# Patient Record
Sex: MALE | Race: WHITE | Employment: OTHER | URBAN - METROPOLITAN AREA
[De-identification: names, ages, dates, MRNs, and addresses within clinical notes are randomized per-mention and may not be internally consistent; named-entity substitution may affect disease eponyms.]

---

## 2017-02-14 ENCOUNTER — HOSPITAL ENCOUNTER (EMERGENCY)
Age: 72
Discharge: HOME | End: 2017-02-14
Attending: EMERGENCY MEDICINE
Payer: MEDICARE

## 2017-02-14 VITALS
RESPIRATION RATE: 16 BRPM | DIASTOLIC BLOOD PRESSURE: 86 MMHG | TEMPERATURE: 98.2 F | OXYGEN SATURATION: 96 % | SYSTOLIC BLOOD PRESSURE: 149 MMHG | HEART RATE: 56 BPM | WEIGHT: 194.89 LBS | BODY MASS INDEX: 27.9 KG/M2 | HEIGHT: 70 IN

## 2017-02-14 DIAGNOSIS — S61.219A LACERATION OF FINGER, INITIAL ENCOUNTER: Primary | ICD-10-CM

## 2017-02-14 DIAGNOSIS — Z23 NEED FOR TETANUS BOOSTER: ICD-10-CM

## 2017-02-14 PROCEDURE — 77030002916 HC SUT ETHLN J&J -A

## 2017-02-14 PROCEDURE — 90471 IMMUNIZATION ADMIN: CPT

## 2017-02-14 PROCEDURE — 75810000293 HC SIMP/SUPERF WND  RPR

## 2017-02-14 PROCEDURE — 77030018836 HC SOL IRR NACL ICUM -A

## 2017-02-14 PROCEDURE — 99282 EMERGENCY DEPT VISIT SF MDM: CPT

## 2017-02-14 PROCEDURE — 74011250636 HC RX REV CODE- 250/636: Performed by: PHYSICIAN ASSISTANT

## 2017-02-14 PROCEDURE — 90715 TDAP VACCINE 7 YRS/> IM: CPT | Performed by: PHYSICIAN ASSISTANT

## 2017-02-14 RX ORDER — LIDOCAINE HYDROCHLORIDE 20 MG/ML
5 INJECTION, SOLUTION EPIDURAL; INFILTRATION; INTRACAUDAL; PERINEURAL ONCE
Status: DISCONTINUED | OUTPATIENT
Start: 2017-02-14 | End: 2017-02-14 | Stop reason: HOSPADM

## 2017-02-14 RX ADMIN — TETANUS TOXOID, REDUCED DIPHTHERIA TOXOID AND ACELLULAR PERTUSSIS VACCINE, ADSORBED 0.5 ML: 5; 2.5; 8; 8; 2.5 SUSPENSION INTRAMUSCULAR at 14:29

## 2017-02-14 NOTE — ED PROVIDER NOTES
The history is provided by the patient. No  was used. Bella Beck is a 80 y.o. male who presents ambulatory to [facility] ED c/o laceration to right 3rd finger after snagging finger with nail. Patient was cleaning out his basement and was throwing out old wood though a window when he tossed a piece of wood with nail in it and the nail snagged the finger. Pain is 2/10 scale. Patient with no other c/o or concerns today. Patient is right hand dominant. PCP: Avtar Proctor MD  Allergies: none      There are no other complaints, changes or physical findings at this time. Past Medical History:   Diagnosis Date    Arrhythmia      atrial fibrillation 2014    Arthritis      both hands    Cancer ([address]      skin cancer    Hypertension        History reviewed. No pertinent past surgical history. History reviewed. No pertinent family history. Social History     Social History    Marital status:      Spouse name: N/A    Number of children: N/A    Years of education: N/A     Occupational History    Not on file. Social History Main Topics    Smoking status: Former Smoker     Quit date: 8/16/2014    Smokeless tobacco: Not on file    Alcohol use Not on file      Comment: quit drinking 1 month ago    Drug use: Not on file    Sexual activity: Not on file     Other Topics Concern    Not on file     Social History Narrative         ALLERGIES: Review of patient's allergies indicates no known allergies. Review of Systems   Constitutional: Negative for fatigue and fever. HENT: Negative for rhinorrhea. Respiratory: Negative for shortness of breath. Cardiovascular: Negative for chest pain. Gastrointestinal: Negative for nausea and vomiting. Genitourinary: Negative for dysuria. Musculoskeletal: Negative for back pain and neck pain.    Skin: Positive for wound.        + Laceration to right 3rd finger   Neurological: Negative for dizziness, light-headedness, numbness and headaches. All other systems reviewed and are negative. Vitals:    02/14/17 1248   BP: 149/86   Pulse: (!) 56   Resp: 16   Temp: 98.2 °F (36.8 °C)   SpO2: 96%   Weight: 88.4 kg (194 lb 14.2 oz)   Height: 5' 10\" (1.778 m)            Physical Exam   Constitutional: He is oriented to person, place, and time. He appears well-developed and well-nourished. Non-toxic appearance. No distress. HENT:   Head: Normocephalic and atraumatic. Head is without right periorbital erythema and without left periorbital erythema. Right Ear: External ear normal.   Left Ear: External ear normal.   Eyes: Conjunctivae and EOM are normal. Pupils are equal, round, and reactive to light. No scleral icterus. Neck: Normal range of motion and full passive range of motion without pain. Cardiovascular: Normal rate, regular rhythm and normal heart sounds. Pulmonary/Chest: Effort normal and breath sounds normal. No accessory muscle usage. No tachypnea. No respiratory distress. He has no decreased breath sounds. He has no wheezes. Abdominal: Soft. There is no tenderness. There is no rigidity and no guarding. Musculoskeletal: Normal range of motion. Right hand: He exhibits laceration. He exhibits normal range of motion, no tenderness, normal capillary refill and no deformity. Hands:  Neurological: He is alert and oriented to person, place, and time. He is not disoriented. No cranial nerve deficit or sensory deficit. GCS eye subscore is 4. GCS verbal subscore is 5. GCS motor subscore is 6. Skin: Skin is intact. No rash noted. Psychiatric: He has a normal mood and affect. His speech is normal.   Nursing note and vitals reviewed. MDM  Number of Diagnoses or Management Options  Laceration of finger, initial encounter:   Need for tetanus booster:   Diagnosis management comments: Will update tetanus.   Mechanism does not suggest the likelihood of fracture or foreign body: imaging deferred  Laceration repair as below. Wound care instructions. Return precautions. ED Course       Procedures     Procedure Note - Laceration Repair: right 3rd finger, single layer  2:49 PM  Procedure by KIRSTY Torres  Complexity: complex  3 cm L shaped laceration to right 3rd finger just distal to PIPJ crease was irrigated copiously with NS under jet lavage, prepped with hibi clens and draped in a sterile fashion. The area was anesthetized with x 2 mLs lido 2% without epi via digital block. Tourniquet applied with good hemostasis. Site irrigated copiously with ns. The wound was explored with the following results: no FB seen. The wound was repaired with x 5 simple interrupted sutures with 4.0 ethilon, single layer closure. The wound was closed with good hemostasis and loose approximation. Tourniquet removed with brisk capillary refill. Bulky gauze non stick dressing applied. Estimated blood loss: <1mL  The procedure took 16-30 minutes, and pt tolerated well    . DISCHARGE NOTE:  3:20 PM  The care plan has been outline with the patient and/or family, who verbally conveyed understanding and agreement. Available results have been reviewed. Patient and/or family understand the follow up plan as outlined and discharge instructions. Should their condition deterioration at any time after discharge the patient agrees to return, follow up sooner than outlined or seek medical assistance at the closest Emergency Room as soon as possible. Questions have been answered. Discharge instructions and educational information regarding the patient's diagnosis as well a list of reasons why the patient would want to seek immediate medical attention, should their condition change, were reviewed directly with the patient/family       CLINICAL IMPRESSION:  1. Laceration of finger, initial encounter    2. Need for tetanus booster        Plan:  1.  Wound care reviewed with patient; cleanse with soap/water and dry completely; no prolonged submersion in water.   2. Follow up with PROSPER Ford, x 7-10 days for wound check and suture removal.     Current Discharge Medication List      CONTINUE these medications which have NOT CHANGED    Details   allopurinol (ZYLOPRIM) 300 mg tablet Take 300 mg by mouth daily. valsartan (DIOVAN) 160 mg tablet Take 160 mg by mouth daily. apixaban (ELIQUIS) 5 mg tablet Take 5 mg by mouth two (2) times a day. omega-3 fatty acids-vitamin e (FISH OIL) 1,000 mg cap Take 1 capsule by mouth. dronedarone (MULTAQ) tab tablet Take 1 tablet by mouth two (2) times daily (with meals). Qty: 60 tablet, Refills: 12           Follow-up Information     Follow up With Details Comments Contact Info    Jose Martini MD Schedule an appointment as soon as possible for a visit in 1 week PRIMARY CARE: have stitches removed in 7-10 days [address] [place] [facility] [address] [phone]          Return to the closest emergency room or follow up sooner for any deterioration  Patient's condition is stable and patient is ready to go home. This note is prepared by Mehul Ho, acting as Scribe for SEVEN Briggs PA-C : The scribe's documentation has been prepared under my direction and personally reviewed by me in its entirety. I confirm that the note above accurately reflects all work, treatment, procedures, and medical decision making performed by me.

## 2017-02-14 NOTE — DISCHARGE INSTRUCTIONS
Thank you for allowing us to provide you with care today. We hope we addressed all of your concerns and needs. We strive to provide excellent quality care in the Emergency Department. Please rate us as excellent, as anything less than excellent does not meet our expectations. If you feel that you have not received excellent quality care or timely care, please ask to speak to the nurse manager. Please choose us in the future for your continued health care needs. The exam and treatment you received in the Emergency Department were for an urgent problem and are not intended as complete care. It is important that you follow-up with a doctor, nurse practitioner, or  [id number] assistant to: (1) confirm your diagnosis, (2) re-evaluation of changes in your illness and treatment, and (3) for ongoing care. If your symptoms become worse or you do not improve as expected and you are unable to reach your usual health care provider, you should return to the Emergency Department. We are available 24 hours a day. Take this sheet with you when you go to your follow-up visit. If you have any problem arranging the follow-up visit, contact the Emergency Department immediately. Make an appointment with your Primary Care doctor for follow up of this visit. Return to the ER if you are unable to be seen in the time recommended on your discharge instructions.

## 2017-06-16 ENCOUNTER — HOSPITAL ENCOUNTER (OUTPATIENT)
Dept: GENERAL RADIOLOGY | Age: 72
Discharge: HOME | End: 2017-06-16
Payer: MEDICARE

## 2017-06-16 DIAGNOSIS — Z90.5 S/P NEPHRECTOMY: ICD-10-CM

## 2017-06-16 PROCEDURE — 71020 XR CHEST PA LAT: CPT

## 2017-09-14 ENCOUNTER — HOSPITAL ENCOUNTER (OUTPATIENT)
Dept: ULTRASOUND IMAGING | Age: 72
Discharge: HOME | End: 2017-09-14
Attending: INTERNAL MEDICINE
Payer: MEDICARE

## 2017-09-14 DIAGNOSIS — N18.30 CHRONIC KIDNEY DISEASE, STAGE III (MODERATE) (HCC): ICD-10-CM

## 2017-09-14 PROCEDURE — 76770 US EXAM ABDO BACK WALL COMP: CPT

## 2017-10-24 ENCOUNTER — APPOINTMENT (OUTPATIENT)
Dept: NUCLEAR MEDICINE | Age: 72
DRG: 395 | End: 2017-10-24
Attending: EMERGENCY MEDICINE
Payer: MEDICARE

## 2017-10-24 ENCOUNTER — APPOINTMENT (OUTPATIENT)
Dept: CT IMAGING | Age: 72
DRG: 395 | End: 2017-10-24
Attending: EMERGENCY MEDICINE
Payer: MEDICARE

## 2017-10-24 ENCOUNTER — HOSPITAL ENCOUNTER (INPATIENT)
Age: 72
LOS: 1 days | Discharge: HOME | DRG: 395 | End: 2017-10-26
Attending: EMERGENCY MEDICINE | Admitting: INTERNAL MEDICINE
Payer: MEDICARE

## 2017-10-24 DIAGNOSIS — K92.2 GASTROINTESTINAL HEMORRHAGE, UNSPECIFIED GASTROINTESTINAL HEMORRHAGE TYPE: Primary | ICD-10-CM

## 2017-10-24 LAB
ABO + RH BLD: NORMAL
ALBUMIN SERPL-MCNC: 3.8 G/DL (ref 3.5–5)
ALBUMIN/GLOB SERPL: 1.1 {RATIO} (ref 1.1–2.2)
ALP SERPL-CCNC: 67 U/L (ref 45–117)
ALT SERPL-CCNC: 40 U/L (ref 12–78)
ANION GAP SERPL CALC-SCNC: 6 MMOL/L (ref 5–15)
APTT PPP: 33.6 SEC (ref 22.1–32.5)
AST SERPL-CCNC: 22 U/L (ref 15–37)
BASOPHILS # BLD: 0 K/UL (ref 0–0.1)
BASOPHILS NFR BLD: 0 % (ref 0–1)
BILIRUB SERPL-MCNC: 0.6 MG/DL (ref 0.2–1)
BLOOD GROUP ANTIBODIES SERPL: NORMAL
BUN SERPL-MCNC: 24 MG/DL (ref 6–20)
BUN/CREAT SERPL: 13 (ref 12–20)
CALCIUM SERPL-MCNC: 8.7 MG/DL (ref 8.5–10.1)
CHLORIDE SERPL-SCNC: 105 MMOL/L (ref 97–108)
CO2 SERPL-SCNC: 28 MMOL/L (ref 21–32)
CREAT SERPL-MCNC: 1.78 MG/DL (ref 0.7–1.3)
EOSINOPHIL # BLD: 0.1 K/UL (ref 0–0.4)
EOSINOPHIL NFR BLD: 2 % (ref 0–7)
ERYTHROCYTE [DISTWIDTH] IN BLOOD BY AUTOMATED COUNT: 13.5 % (ref 11.5–14.5)
GLOBULIN SER CALC-MCNC: 3.5 G/DL (ref 2–4)
GLUCOSE SERPL-MCNC: 81 MG/DL (ref 65–100)
HCT VFR BLD AUTO: 37.8 % (ref 36.6–50.3)
HEMOCCULT STL QL: POSITIVE
HGB BLD-MCNC: 13.2 G/DL (ref 12.1–17)
INR PPP: 1.1 (ref 0.9–1.1)
LYMPHOCYTES # BLD: 1.6 K/UL (ref 0.8–3.5)
LYMPHOCYTES NFR BLD: 31 % (ref 12–49)
MCH RBC QN AUTO: 31.7 PG (ref 26–34)
MCHC RBC AUTO-ENTMCNC: 34.9 G/DL (ref 30–36.5)
MCV RBC AUTO: 90.6 FL (ref 80–99)
MONOCYTES # BLD: 0.3 K/UL (ref 0–1)
MONOCYTES NFR BLD: 6 % (ref 5–13)
NEUTS SEG # BLD: 3.2 K/UL (ref 1.8–8)
NEUTS SEG NFR BLD: 61 % (ref 32–75)
PLATELET # BLD AUTO: 134 K/UL (ref 150–400)
POTASSIUM SERPL-SCNC: 4.7 MMOL/L (ref 3.5–5.1)
PROT SERPL-MCNC: 7.3 G/DL (ref 6.4–8.2)
PROTHROMBIN TIME: 10.6 SEC (ref 9–11.1)
RBC # BLD AUTO: 4.17 M/UL (ref 4.1–5.7)
SODIUM SERPL-SCNC: 139 MMOL/L (ref 136–145)
SPECIMEN EXP DATE BLD: NORMAL
THERAPEUTIC RANGE,PTTT: ABNORMAL SECS (ref 58–77)
WBC # BLD AUTO: 5.2 K/UL (ref 4.1–11.1)

## 2017-10-24 PROCEDURE — C9113 INJ PANTOPRAZOLE SODIUM, VIA: HCPCS | Performed by: EMERGENCY MEDICINE

## 2017-10-24 PROCEDURE — 74011250636 HC RX REV CODE- 250/636: Performed by: EMERGENCY MEDICINE

## 2017-10-24 PROCEDURE — 85730 THROMBOPLASTIN TIME PARTIAL: CPT | Performed by: EMERGENCY MEDICINE

## 2017-10-24 PROCEDURE — 80053 COMPREHEN METABOLIC PANEL: CPT | Performed by: EMERGENCY MEDICINE

## 2017-10-24 PROCEDURE — 96374 THER/PROPH/DIAG INJ IV PUSH: CPT

## 2017-10-24 PROCEDURE — 82272 OCCULT BLD FECES 1-3 TESTS: CPT | Performed by: EMERGENCY MEDICINE

## 2017-10-24 PROCEDURE — 65660000000 HC RM CCU STEPDOWN

## 2017-10-24 PROCEDURE — 85610 PROTHROMBIN TIME: CPT | Performed by: EMERGENCY MEDICINE

## 2017-10-24 PROCEDURE — 65390000012 HC CONDITION CODE 44 OBSERVATION

## 2017-10-24 PROCEDURE — 86900 BLOOD TYPING SEROLOGIC ABO: CPT | Performed by: EMERGENCY MEDICINE

## 2017-10-24 PROCEDURE — 74011000250 HC RX REV CODE- 250: Performed by: EMERGENCY MEDICINE

## 2017-10-24 PROCEDURE — 85025 COMPLETE CBC W/AUTO DIFF WBC: CPT | Performed by: EMERGENCY MEDICINE

## 2017-10-24 PROCEDURE — 36415 COLL VENOUS BLD VENIPUNCTURE: CPT | Performed by: EMERGENCY MEDICINE

## 2017-10-24 PROCEDURE — 99284 EMERGENCY DEPT VISIT MOD MDM: CPT

## 2017-10-24 PROCEDURE — A9560 TC99M LABELED RBC: HCPCS

## 2017-10-24 RX ORDER — ROSUVASTATIN CALCIUM 10 MG/1
10 TABLET, COATED ORAL
COMMUNITY

## 2017-10-24 RX ORDER — VALSARTAN 80 MG/1
80 TABLET ORAL DAILY
COMMUNITY

## 2017-10-24 RX ORDER — SODIUM CHLORIDE 9 MG/ML
50 INJECTION, SOLUTION INTRAVENOUS
Status: DISPENSED | OUTPATIENT
Start: 2017-10-24 | End: 2017-10-25

## 2017-10-24 RX ORDER — SODIUM CHLORIDE 0.9 % (FLUSH) 0.9 %
10 SYRINGE (ML) INJECTION
Status: ACTIVE | OUTPATIENT
Start: 2017-10-24 | End: 2017-10-25

## 2017-10-24 RX ORDER — BISMUTH SUBSALICYLATE 262 MG
1 TABLET,CHEWABLE ORAL DAILY
COMMUNITY

## 2017-10-24 RX ADMIN — SODIUM CHLORIDE 40 MG: 9 INJECTION INTRAMUSCULAR; INTRAVENOUS; SUBCUTANEOUS at 19:31

## 2017-10-24 NOTE — IP AVS SNAPSHOT
[address] [facility] 
[phone] Patient: Sabina Richardson MRN: [id number] About your hospitalization You were admitted on:  October 24, 2017 You last received care in the:  [facility] You were discharged on:  October 26, 2017 Why you were hospitalized Your primary diagnosis was:  Not on File Your diagnoses also included:  Acute Gi Bleeding, Gi Bleed Things You Need To Do (next 8 weeks) Follow up with Ad Clements MD in 35 month(s) Repeat Colonoscopy Phone:  [phone] Where:  [address], [address] [facility], [address] [address] Thursday Nov 02, 2017 Follow up with Federico Penaloza MD  
10;00am  hospital follow-up Phone:  [phone] Where:  [address], [address] [address] Discharge Orders None A check wade indicates which time of day the medication should be taken. My Medications TAKE these medications as instructed Instructions Each Dose to Equal  
 Morning Noon Evening Bedtime COENZYME Q10 PO Your last dose was: Your next dose is: Take 1 Tab by mouth daily. 1 Tab  
    
   
   
   
  
 dronedarone Tab tablet Commonly known as:  Orma Chard Your last dose was: Your next dose is: Take 1 tablet by mouth two (2) times daily (with meals). 400 mg  
    
   
   
   
  
 ELIQUIS 5 mg tablet Generic drug:  apixaban  
Start taking on:  11/8/2017 Your last dose was: Your next dose is: Take 1 Tab by mouth two (2) times a day. 5 mg FISH OIL 1,000 mg Cap Generic drug:  omega-3 fatty acids-vitamin e Your last dose was: Your next dose is: Take 1 capsule by mouth. 1 Cap  
    
   
   
   
  
 multivitamin tablet Commonly known as:  ONE A DAY  
   
 Your last dose was: Your next dose is: Take 1 Tab by mouth daily. 1 Tab  
    
   
   
   
  
 rosuvastatin 10 mg tablet Commonly known as:  CRESTOR Your last dose was: Your next dose is: Take 10 mg by mouth daily (after lunch). 10 mg  
    
   
   
   
  
 valsartan 80 mg tablet Commonly known as:  DIOVAN Your last dose was: Your next dose is: Take 80 mg by mouth daily. 80 mg Discharge Instructions HOSPITALIST DISCHARGE INSTRUCTIONS 
 
NAME: Claudeen Hickman :  1945 MRN:  [id number] Date/Time:  10/26/2017 9:46 AM 
 
ADMIT DATE: 10/24/2017 DISCHARGE DATE: 10/26/2017 · It is important that you take the medication exactly as they are prescribed. · Keep your medication in the bottles provided by the pharmacist and keep a list of the medication names, dosages, and times to be taken in your wallet. · Do not take other medications without consulting your doctor. What to do at [facility] Recommended diet:  Regular Diet Recommended activity: Activity as tolerated If you have questions regarding the hospital related prescriptions or hospital related issues please call [facility] at [phone]. You can always direct your questions to your primary care doctor if you are unable to reach your hospital physician; your PCP works as an extension of your hospital doctor just like your hospital doctor is an extension of your PCP for your time at the hospital [facility], [facility]) If you experience any of the following symptoms then please call your primary care physician or return to the emergency room if you cannot get hold of your doctor: 
 
Fever, chills, nausea, vomiting, or persistent diarrhea Worsening weakness or new problems with your speech or balance Dark stools or visible blood in your stools New Leg swelling or shortness of breath as this could be signs of a clot Additional Instructions: 
 
 
Bring these papers with you to your follow up appointments. The papers will help your doctors be sure to continue the care plan from the hospital. 
 
 
 
 
 
 
Information obtained by : 
I understand that if any problems occur once I am at home I am to contact my physician. I understand and acknowledge receipt of the instructions indicated above. Physician's or R.N.'s Signature                                                                  Date/Time Patient or Representative Signature Introducing [address] [facility] introduces [employer] patient portal. Now you can access parts of your medical record, email your doctor's office, and request medication refills online. 1. In your internet browser, go to [email or web address] 2. Click on the First Time User? Click Here link in the Sign In box. You will see the New Member Sign Up page. 3. Enter your [employer] Access Code exactly as it appears below. You will not need to use this code after youve completed the sign-up process. If you do not sign up before the expiration date, you must request a new code. · [employer] Access Code: [email or web address] Expires: 12/7/2017  1:37 PM 
 
4. Enter the last four digits of your Social Security Number (xxxx) and Date of Birth (mm/dd/yyyy) as indicated and click Submit. You will be taken to the next sign-up page. 5. Create a [employer] ID. This will be your [employer] login ID and cannot be changed, so think of one that is secure and easy to remember. 6. Create a [employer] password. You can change your password at any time. 7. Enter your Password Reset Question and Answer. This can be used at a later time if you forget your password. 8. Enter your e-mail address. You will receive e-mail notification when new information is available in [address] 9. Click Sign Up. You can now view and download portions of your medical record. 10. Click the Download Summary menu link to download a portable copy of your medical information. If you have questions, please visit the Frequently Asked Questions section of the [employer] website. Remember, [employer] is NOT to be used for urgent needs. For medical emergencies, dial 911. Now available from your iPhone and Android! Unresulted Labs-Please follow up with your PCP about these lab tests Order Current Status SAMPLE TO BLOOD BANK In process SAMPLE TO BLOOD BANK In process Providers Seen During Your Hospitalization Provider Specialty Primary office phone Vee Nicholas MD Emergency Medicine [phone] Apollo Pardo MD Internal Medicine [phone] Kaleb Zheng MD Internal Medicine [phone] Your Primary Care Physician (PCP) Primary Care Physician Office Phone Office Fax Guy Castro, [address] [phone] You are allergic to the following No active allergies Recent Documentation Height Weight BMI Smoking Status 1.778 m 78.2 kg 24.74 kg/m2 Former Smoker Emergency Contacts Name Discharge Info Relation Home Work Mobile Sharon Mary DISCHARGE CAREGIVER [3] Spouse [3] [phone] Patient Belongings The following personal items are in your possession at time of discharge: 
  Dental Appliances: None  Visual Aid: Glasses, At home      Home Medications: None   Jewelry: Ring  Clothing: None    Other Valuables: None Please provide this summary of care documentation to your next provider. Signatures-by signing, you are acknowledging that this After Visit Summary has been reviewed with you and you have received a copy. Patient Signature:  ____________________________________________________________ Date:  ____________________________________________________________  
  
Neelam  Provider Signature:  ____________________________________________________________ Date:  ____________________________________________________________

## 2017-10-24 NOTE — ED PROVIDER NOTES
[facility] [address]  EMERGENCY DEPARTMENT HISTORY AND PHYSICAL EXAM       Date of Service: 10/24/2017   Patient Name: Breck Nissen   YOB: 1945  Medical Record Number: [id number]    History of Presenting Illness     Chief Complaint   Patient presents with    Rectal Bleeding     Onset at 1300 today. Pt denies rectal trauma. History Provided By:  patient    Additional History:   Breck Nissen is a 71 y.o. male with PMHx significant for HTN and afib who presents ambulatory to the ED with cc of two episodes of BRB per rectum that started this morning. He states that his first episode of BRB per rectum occurred after a bowel movement when he was wiping. He states that during his second episode, he saw blood soaked through his underwear onto the chair he was seated on. He notes that he has a history of similar symptoms, but denies medical evaluation at that time. He reports that he is prescribed Eliquis. He denies recent colonoscopy. He also denies close GI follow up or history of diverticulosis. He specifically denies nausea, vomiting, abdominal pain, diarrhea, or other complaints at this time. Social Hx: - Tobacco (former), - EtOH    There are no other complaints, changes or physical findings at this time. Primary Care Provider: Ryan Yu MD     Past History     Past Medical History:   Past Medical History:   Diagnosis Date    Arrhythmia     atrial fibrillation 2014    Arthritis     both hands    Cancer ([facility] [address]     skin cancer    Hypertension         Past Surgical History:   History reviewed. No pertinent surgical history. Family History:   History reviewed. No pertinent family history.      Social History:   Social History   Substance Use Topics    Smoking status: Former Smoker     Quit date: 8/16/2014    Smokeless tobacco: Never Used    Alcohol use None      Comment: quit drinking 1 month ago        Allergies:   No Known Allergies      Review of Systems   Review of Systems   Constitutional: Negative for chills and fever. Respiratory: Negative for cough and shortness of breath. Cardiovascular: Negative for chest pain. Gastrointestinal: Positive for blood in stool. Negative for abdominal pain, constipation, diarrhea, nausea and vomiting. Neurological: Negative for weakness and numbness. All other systems reviewed and are negative. Physical Exam  Physical Exam   Constitutional: He is oriented to person, place, and time. He appears well-developed and well-nourished. HENT:   Head: Normocephalic and atraumatic. Eyes: Conjunctivae and EOM are normal.   Neck: Normal range of motion. Neck supple. Cardiovascular: Normal rate and regular rhythm. Pulmonary/Chest: Effort normal and breath sounds normal. No respiratory distress. Abdominal: Soft. He exhibits no distension. There is no tenderness. Genitourinary:   Genitourinary Comments: Rectal exam: Gross blood collected. No hemorrhoids, fissures, or tears. Musculoskeletal: Normal range of motion. Neurological: He is alert and oriented to person, place, and time. Skin: Skin is warm and dry. Psychiatric: He has a normal mood and affect. Nursing note and vitals reviewed. Medical Decision Making   I am the first provider for this patient. I reviewed the vital signs, available nursing notes, past medical history, past surgical history, family history and social history. Old Medical Records: Old medical records. Nursing notes. Provider Notes:   Patient presents with lower GI bleeding. DDx: AVM, diverticulosis, diverticulitis, IBD, IBS, colitis, hemorrhoids. Will get labs, T+S, keep on monitor and give fluids to start if prior EF% allows. Orthostatics obtained. ED Course:  5:58 PM   Initial assessment performed. The patients presenting problems have been discussed, and they are in agreement with the care plan formulated and outlined with them.   I have encouraged them to ask questions as they arise throughout their visit. Procedure Note - Rectal Exam:   6:05 PM  Performed by: Melina Elaine MD  Chaperoned by: Mendoza Burden RN  Rectal exam performed. Gross blood was collected. Stool was collected and sent to the lab for Hemoccult testing. Other findings: No hemorrhoids, fissures, or tears. The procedure took 1-15 minutes, and pt tolerated well. Progress Note:  6:20 PM  The patient and his family were informed of the likelihood that he will be admitted today with GI consultation for a colonoscopy. Pt is in agreement with the plan. Written by Tameka Reese ED Scribe, as dictated by Melina Elaine MD.    Consult Note:  7:06 PM  Melina Elaine MD spoke with Fide Hernandez M.D.,  Specialty: GI  Discussed pt's hx, disposition, and available diagnostic and imaging results. Reviewed care plans. Consultant agrees with plans as outlined. Dr. Brayton Landau recommends hospitalist admission. She also recommends ordering Protonix and a bleeding scan. CONSULT NOTE:   8:33 PM  Melina Elaine MD spoke with Zuleyma Reyes MD,   Specialty: Hospitalist  Discussed pt's hx, disposition, and available diagnostic and imaging results. Reviewed care plans. Consultant will evaluate pt for admission.   Written by Tameka Reese ED Scribe, as dictated by Melina Elaine MD.      Diagnostic Study Results     Labs -      Recent Results (from the past 12 hour(s))   TYPE & SCREEN    Collection Time: 10/24/17  5:20 PM   Result Value Ref Range    Crossmatch Expiration 10/27/2017     ABO/Rh(D) Maxcine Guanaco POSITIVE     Antibody screen NEG    CBC WITH AUTOMATED DIFF    Collection Time: 10/24/17  5:21 PM   Result Value Ref Range    WBC 5.2 4.1 - 11.1 K/uL    RBC 4.17 4.10 - 5.70 M/uL    HGB 13.2 12.1 - 17.0 g/dL    HCT 37.8 36.6 - 50.3 %    MCV 90.6 80.0 - 99.0 FL    MCH 31.7 26.0 - 34.0 PG    MCHC 34.9 30.0 - 36.5 g/dL    RDW 13.5 11.5 - 14.5 %    PLATELET 229 (L) 616 - 400 K/uL    NEUTROPHILS 61 32 - 75 % LYMPHOCYTES 31 12 - 49 %    MONOCYTES 6 5 - 13 %    EOSINOPHILS 2 0 - 7 %    BASOPHILS 0 0 - 1 %    ABS. NEUTROPHILS 3.2 1.8 - 8.0 K/UL    ABS. LYMPHOCYTES 1.6 0.8 - 3.5 K/UL    ABS. MONOCYTES 0.3 0.0 - 1.0 K/UL    ABS. EOSINOPHILS 0.1 0.0 - 0.4 K/UL    ABS. BASOPHILS 0.0 0.0 - 0.1 K/UL   METABOLIC PANEL, COMPREHENSIVE    Collection Time: 10/24/17  5:21 PM   Result Value Ref Range    Sodium 139 136 - 145 mmol/L    Potassium 4.7 3.5 - 5.1 mmol/L    Chloride 105 97 - 108 mmol/L    CO2 28 21 - 32 mmol/L    Anion gap 6 5 - 15 mmol/L    Glucose 81 65 - 100 mg/dL    BUN 24 (H) 6 - 20 MG/DL    Creatinine 1.78 (H) 0.70 - 1.30 MG/DL    BUN/Creatinine ratio 13 12 - 20      GFR est AA 46 (L) >60 ml/min/1.73m2    GFR est non-AA 38 (L) >60 ml/min/1.73m2    Calcium 8.7 8.5 - 10.1 MG/DL    Bilirubin, total 0.6 0.2 - 1.0 MG/DL    ALT (SGPT) 40 12 - 78 U/L    AST (SGOT) 22 15 - 37 U/L    Alk. phosphatase 67 45 - 117 U/L    Protein, total 7.3 6.4 - 8.2 g/dL    Albumin 3.8 3.5 - 5.0 g/dL    Globulin 3.5 2.0 - 4.0 g/dL    A-G Ratio 1.1 1.1 - 2.2     PROTHROMBIN TIME + INR    Collection Time: 10/24/17  5:21 PM   Result Value Ref Range    INR 1.1 0.9 - 1.1      Prothrombin time 10.6 9.0 - 11.1 sec   PTT    Collection Time: 10/24/17  5:21 PM   Result Value Ref Range    aPTT 33.6 (H) 22.1 - 32.5 sec    aPTT, therapeutic range     58.0 - 77.0 SECS   OCCULT BLOOD, STOOL    Collection Time: 10/24/17  6:27 PM   Result Value Ref Range    Occult blood, stool POSITIVE (A) NEG         Vital Signs-Reviewed the patient's vital signs.    Patient Vitals for the past 12 hrs:   Temp Pulse Resp BP SpO2   10/24/17 2000 - (!) 44 8 150/62 100 %   10/24/17 1900 - (!) 42 12 153/67 100 %   10/24/17 1836 - (!) 46 - 172/67 -   10/24/17 1625 98.5 °F (36.9 °C) (!) 50 16 170/80 99 %       Medications Given in the ED:  Medications   0.9% sodium chloride infusion (not administered)   iopamidol (ISOVUE-370) 76 % injection 100 mL (not administered)   sodium chloride (NS) flush 10 mL (not administered)   sodium chloride 0.9 % bolus infusion 1,000 mL (not administered)   pantoprazole (PROTONIX) 40 mg in sodium chloride 0.9 % 10 mL injection (40 mg IntraVENous Given 10/24/17 1931)       Diagnosis   Clinical Impression:   1. Gastrointestinal hemorrhage, unspecified gastrointestinal hemorrhage type         Plan:  1: Admit to Hospitalist    Disposition Note:  Admit Note:  8:33 PM  Pt is being admitted by Esmer Zambrano MD. The results of their tests and reason(s) for their admission have been discussed with pt and/or available family. They convey agreement and understanding for the need to be admitted and for admission diagnosis. This note is prepared by Erik Puente, acting as a Scribe for Berta Silvestre MD.    Berta Silvestre MD: The scribe's documentation has been prepared under my direction and personally reviewed by me in its entirety. I confirm that the notes above accurately reflects all work, treatment, procedures, and medical decision making performed by me. This note will not be viewable in [address]

## 2017-10-24 NOTE — ED NOTES
Bedside shift change report given to Lexie Lambert RN (oncoming nurse) by Angelo Pham RN (offgoing nurse). Report included the following information SBAR, ED Summary and MAR.

## 2017-10-25 ENCOUNTER — ANESTHESIA EVENT (OUTPATIENT)
Dept: ENDOSCOPY | Age: 72
DRG: 395 | End: 2017-10-25
Payer: MEDICARE

## 2017-10-25 LAB
ANION GAP SERPL CALC-SCNC: 9 MMOL/L (ref 5–15)
BUN SERPL-MCNC: 20 MG/DL (ref 6–20)
BUN/CREAT SERPL: 13 (ref 12–20)
CALCIUM SERPL-MCNC: 8.5 MG/DL (ref 8.5–10.1)
CHLORIDE SERPL-SCNC: 108 MMOL/L (ref 97–108)
CO2 SERPL-SCNC: 24 MMOL/L (ref 21–32)
CREAT SERPL-MCNC: 1.53 MG/DL (ref 0.7–1.3)
GLUCOSE SERPL-MCNC: 72 MG/DL (ref 65–100)
HCT VFR BLD AUTO: 38.4 % (ref 36.6–50.3)
HCT VFR BLD AUTO: 39 % (ref 36.6–50.3)
HGB BLD-MCNC: 12.7 G/DL (ref 12.1–17)
HGB BLD-MCNC: 13.3 G/DL (ref 12.1–17)
MAGNESIUM SERPL-MCNC: 2.1 MG/DL (ref 1.6–2.4)
PHOSPHATE SERPL-MCNC: 3.1 MG/DL (ref 2.6–4.7)
POTASSIUM SERPL-SCNC: 4 MMOL/L (ref 3.5–5.1)
SODIUM SERPL-SCNC: 141 MMOL/L (ref 136–145)

## 2017-10-25 PROCEDURE — 96376 TX/PRO/DX INJ SAME DRUG ADON: CPT

## 2017-10-25 PROCEDURE — 36415 COLL VENOUS BLD VENIPUNCTURE: CPT | Performed by: INTERNAL MEDICINE

## 2017-10-25 PROCEDURE — 65390000012 HC CONDITION CODE 44 OBSERVATION

## 2017-10-25 PROCEDURE — 74011250636 HC RX REV CODE- 250/636: Performed by: GENERAL ACUTE CARE HOSPITAL

## 2017-10-25 PROCEDURE — 74011000250 HC RX REV CODE- 250: Performed by: INTERNAL MEDICINE

## 2017-10-25 PROCEDURE — C9113 INJ PANTOPRAZOLE SODIUM, VIA: HCPCS | Performed by: GENERAL ACUTE CARE HOSPITAL

## 2017-10-25 PROCEDURE — 85018 HEMOGLOBIN: CPT | Performed by: GENERAL ACUTE CARE HOSPITAL

## 2017-10-25 PROCEDURE — 84100 ASSAY OF PHOSPHORUS: CPT | Performed by: GENERAL ACUTE CARE HOSPITAL

## 2017-10-25 PROCEDURE — 83735 ASSAY OF MAGNESIUM: CPT | Performed by: GENERAL ACUTE CARE HOSPITAL

## 2017-10-25 PROCEDURE — 99218 HC RM OBSERVATION: CPT

## 2017-10-25 PROCEDURE — 74011000250 HC RX REV CODE- 250: Performed by: GENERAL ACUTE CARE HOSPITAL

## 2017-10-25 PROCEDURE — 80048 BASIC METABOLIC PNL TOTAL CA: CPT | Performed by: GENERAL ACUTE CARE HOSPITAL

## 2017-10-25 PROCEDURE — 74011250637 HC RX REV CODE- 250/637: Performed by: GENERAL ACUTE CARE HOSPITAL

## 2017-10-25 RX ORDER — SODIUM CHLORIDE 0.9 % (FLUSH) 0.9 %
5-10 SYRINGE (ML) INJECTION AS NEEDED
Status: DISCONTINUED | OUTPATIENT
Start: 2017-10-25 | End: 2017-10-26 | Stop reason: HOSPADM

## 2017-10-25 RX ORDER — SODIUM CHLORIDE 0.9 % (FLUSH) 0.9 %
5-10 SYRINGE (ML) INJECTION EVERY 8 HOURS
Status: DISCONTINUED | OUTPATIENT
Start: 2017-10-25 | End: 2017-10-26 | Stop reason: HOSPADM

## 2017-10-25 RX ORDER — THERA TABS 400 MCG
1 TAB ORAL DAILY
Status: DISCONTINUED | OUTPATIENT
Start: 2017-10-25 | End: 2017-10-26 | Stop reason: HOSPADM

## 2017-10-25 RX ORDER — POLYETHYLENE GLYCOL 3350, SODIUM SULFATE ANHYDROUS, SODIUM BICARBONATE, SODIUM CHLORIDE, POTASSIUM CHLORIDE 236; 22.74; 6.74; 5.86; 2.97 G/4L; G/4L; G/4L; G/4L; G/4L
4000 POWDER, FOR SOLUTION ORAL ONCE
Status: COMPLETED | OUTPATIENT
Start: 2017-10-25 | End: 2017-10-25

## 2017-10-25 RX ORDER — ATORVASTATIN CALCIUM 20 MG/1
20 TABLET, FILM COATED ORAL DAILY
Status: DISCONTINUED | OUTPATIENT
Start: 2017-10-25 | End: 2017-10-26 | Stop reason: HOSPADM

## 2017-10-25 RX ADMIN — THERA TABS 1 TABLET: TAB at 10:22

## 2017-10-25 RX ADMIN — Medication 10 ML: at 21:37

## 2017-10-25 RX ADMIN — Medication 5 ML: at 02:03

## 2017-10-25 RX ADMIN — Medication 10 ML: at 10:23

## 2017-10-25 RX ADMIN — OMEGA-3 FATTY ACIDS CAP DELAYED RELEASE 1000 MG 1 CAPSULE: 1000 CAPSULE DELAYED RELEASE at 10:22

## 2017-10-25 RX ADMIN — SODIUM CHLORIDE 40 MG: 9 INJECTION INTRAMUSCULAR; INTRAVENOUS; SUBCUTANEOUS at 10:21

## 2017-10-25 RX ADMIN — SODIUM CHLORIDE 40 MG: 9 INJECTION INTRAMUSCULAR; INTRAVENOUS; SUBCUTANEOUS at 20:15

## 2017-10-25 RX ADMIN — Medication 10 ML: at 05:26

## 2017-10-25 RX ADMIN — DRONEDARONE 400 MG: 400 TABLET, FILM COATED ORAL at 17:58

## 2017-10-25 RX ADMIN — DRONEDARONE 400 MG: 400 TABLET, FILM COATED ORAL at 10:22

## 2017-10-25 RX ADMIN — ATORVASTATIN CALCIUM 20 MG: 20 TABLET, FILM COATED ORAL at 10:22

## 2017-10-25 RX ADMIN — Medication 10 ML: at 15:47

## 2017-10-25 RX ADMIN — POLYETHYLENE GLYCOL 3350, SODIUM SULFATE ANHYDROUS, SODIUM BICARBONATE, SODIUM CHLORIDE, POTASSIUM CHLORIDE 4000 ML: 236; 22.74; 6.74; 5.86; 2.97 POWDER, FOR SOLUTION ORAL at 15:42

## 2017-10-25 NOTE — CDMP QUERY
Dr. Karen Posada,    Please clarify if this patient is being treated/managed for:    =>CKD stage III in the setting of GFR of 38/45 requiring monitoring  =>Other Explanation of clinical findings  =>Unable to Determine (no explanation of clinical findings)    The medical record reflects the following clinical findings, treatment, and risk factors:    Risk Factors: 70 y/o pt  Clinical Indicators: GFR 38/45  Treatment: monitoring    Please clarify and document your clinical opinion in the progress notes and discharge summary including the definitive and/or presumptive diagnosis, (suspected or probable), related to the above clinical findings. Please include clinical findings supporting your diagnosis.     Thank you,   Claudia Sandoval, [address]

## 2017-10-25 NOTE — PROGRESS NOTES
As per  notification code 44 given. Oral and Written notification given to patient and/or caregiver informing them that they are currently an Outpatient receiving care in our facility. Outpatient services include Observation Services.      Raman Toure  Ext [phone]

## 2017-10-25 NOTE — ANESTHESIA PREPROCEDURE EVALUATION
Anesthetic History   No history of anesthetic complications            Review of Systems / Medical History  Patient summary reviewed, nursing notes reviewed and pertinent labs reviewed    Pulmonary  Within defined limits                 Neuro/Psych   Within defined limits           Cardiovascular    Hypertension        Dysrhythmias (2014 cardioversion x 1 for AFib;    Hx of SBrady, asymptomatic)   Hyperlipidemia    Exercise tolerance: >4 METS  Comments:  EKG: SB, rate 45,  with PACs   GI/Hepatic/Renal               Comments: Hematochezia Endo/Other        Arthritis and cancer ([facility])     Other Findings            Physical Exam    Airway  Mallampati: II  TM Distance: 4 - 6 cm  Neck ROM: normal range of motion   Mouth opening: Normal     Cardiovascular  Regular rate and rhythm,  S1 and S2 normal,  no murmur, click, rub, or gallop             Dental      Comments: Discolored, dark teeth, none loose   Pulmonary  Breath sounds clear to auscultation               Abdominal  GI exam deferred       Other Findings            Anesthetic Plan    ASA: 2  Anesthesia type: total IV anesthesia          Induction: Intravenous  Anesthetic plan and risks discussed with: Patient

## 2017-10-25 NOTE — CDMP QUERY
Dr. Bowen Betancur,    Thank you for documenting the diagnosis of A-fib for this patient. Please clarify if this diagnosis could be further specified as:     =>Paroxysmal  =>Persistent  =>Permanent  =>Other explanations of clinical findings  =>Unable to Determine    Please document your response indicating your clinical opinion in your progress notes and discharge summary. -------------------------------------------------------------------------------------------------  Paroxysmal:  begins suddenly and stops on its own. Symptoms can be mild or severe. They stop within about a week, but usually in less than 24 hours. Persistent: continues for more than a week. It may stop on its own, or it can be stopped with treatment. Permanent: cannot be restored with treatment    Please clarify and document your clinical opinion in the progress notes and discharge summary including the definitive and/or presumptive diagnosis, (suspected or probable), related to the above clinical findings. Please include clinical findings supporting your diagnosis.     Thank you,   Layla Goldstein, [address] [facility]

## 2017-10-25 NOTE — PROGRESS NOTES
0045 - TRANSFER - IN REPORT:    Verbal report received from [facility]) on Mavis Seo  being received from ED(unit) for routine progression of care    Report consisted of patients Situation, Background, Assessment and   Recommendations(SBAR). Information from the following report(s) SBAR, Kardex, ED Summary, Procedure Summary, Intake/Output, MAR, Accordion, Recent Results and Med Rec Status was reviewed with the receiving nurse. Opportunity for questions and clarification was provided. Assessment completed upon patients arrival to unit and care assumed. 9174- Patient arrived to room 3235 from [address] via stretcher, patient placed into bed in position of comfort. Oriented patient to room/unit. CB in reach.

## 2017-10-25 NOTE — PROGRESS NOTES
1556 H/H sent to the lab:     Results for Junito Lozada (MRN [id number]) as of 10/25/2017 17:09     Ref. Range 10/25/2017 15:56   HGB Latest Ref Range: 12.1 - 17.0 g/dL 13.3   HCT Latest Ref Range: 36.6 - 50.3 % 39.0         1800 Consent signed.

## 2017-10-25 NOTE — CONSULTS
GASTROENTEROLOGY CONSULTATION NOTE                            P. Cherie Maravilla MD  [facility], [address], [facility]  [address]  [phone]  [email or web address]        NAME:  Mavis Seo   :   1945   MRN:   [id number]   PCP: Poncho Latham MD  Date/Time:  10/25/2017 7:46 AM    Referring Physician: Kassy Cline MD    Consult Date: 10/25/2017 7:46 AM    Reason for consult: rectal bleeding                   Assessment:   1. Hematochezia, acute onset. Two episodes. 2. No significant drop in hgb  3. Anticoagulated with Eliquis  4. Remote colonoscopy       Active Problems:    Acute GI bleeding (10/24/2017)        Plan:   Clear liquids  Bowel prep today  Colonoscopy in am         History of Present Illness:  Patient is a 71 y.o. who is seen in consultation at the request of Dr. José Manuel Vines for rectal bleeding    Mavis Seo is a 71 y.o.   male who came to [facility] ED complaing of acute onset or red rectal bleeding. Has had occasional red blood on toilet paper when wipes for several years. Yesterday morning he noted more significant amount of red blood with stool. Then later in the morning he realized he had passed a larger amount of red blood that soaked through his pants onto chair. Was not associated with a bowel movement. No further episodes after that. Has been on Eliquis for history of afib. No asa or nsaid use. No other GI symptoms. No hx of PUD. Had a colonoscopy more than 20 yrs ago. Not sure if had any diverticulosis.         PMH:  Past Medical History:   Diagnosis Date    Arrhythmia     atrial fibrillation 2014    Arthritis     both hands    Cancer ([address]     skin cancer    Hypertension        PSH:  Past Surgical History:   Procedure Laterality Date    HX NEPHRECTOMY Right     d/t renal CA       Allergies:  No Known Allergies      Hospital Medications:  Current Facility-Administered Medications   Medication Dose Route Frequency    dronedarone (MULTAQ) tablet tab 400 mg  400 mg Oral BID WITH MEALS    therapeutic multivitamin (THERAGRAN) tablet 1 Tab  1 Tab Oral DAILY    fish oil-omega-3 fatty acids 340-1,000 mg capsule 1 Cap  1 Cap Oral DAILY    atorvastatin (LIPITOR) tablet 20 mg  20 mg Oral DAILY    sodium chloride (NS) flush 5-10 mL  5-10 mL IntraVENous Q8H    sodium chloride (NS) flush 5-10 mL  5-10 mL IntraVENous PRN    sodium chloride 0.9 % bolus infusion 1,000 mL  1,000 mL IntraVENous ONCE    pantoprazole (PROTONIX) 40 mg in sodium chloride 0.9 % 10 mL injection  40 mg IntraVENous Q12H       Home Medications:  Prior to Admission Medications   Prescriptions Last Dose Informant Patient Reported? Taking? UBIDECARENONE (COENZYME Q10 PO) 10/24/2017 at Unknown time Self Yes Yes   Sig: Take 1 Tab by mouth daily. apixaban (ELIQUIS) 5 mg tablet 10/24/2017 at Unknown time Self Yes Yes   Sig: Take 5 mg by mouth two (2) times a day. dronedarone (MULTAQ) tab tablet 10/24/2017 at Unknown time Self No Yes   Sig: Take 1 tablet by mouth two (2) times daily (with meals). multivitamin (ONE A DAY) tablet 10/24/2017 at Unknown time Self Yes Yes   Sig: Take 1 Tab by mouth daily. omega-3 fatty acids-vitamin e (FISH OIL) 1,000 mg cap 10/24/2017 at Unknown time Self Yes Yes   Sig: Take 1 capsule by mouth. rosuvastatin (CRESTOR) 10 mg tablet 10/24/2017 at Unknown time Self Yes Yes   Sig: Take 10 mg by mouth daily (after lunch). valsartan (DIOVAN) 80 mg tablet 10/24/2017 at Unknown time Self Yes Yes   Sig: Take 80 mg by mouth daily. Facility-Administered Medications: None       Social History:  Social History   Substance Use Topics    Smoking status: Former Smoker     Quit date: 8/16/2014    Smokeless tobacco: Never Used    Alcohol use Not on file      Comment: quit drinking 1 month ago       Family History:  History reviewed. No pertinent family history.           Objective:   Patient Vitals for the past 8 hrs:   BP Temp Pulse Resp SpO2   10/25/17 0600 156/75 98.3 °F (36.8 °C) (!) 43 18 97 %   10/25/17 0153 164/42 97.8 °F (36.6 °C) (!) 42 18 97 %             EXAM:     NEURO-a&o   HEENT-wnl   LUNGS-clear    COR-regular rate and rhythym     ABD-soft , no tenderness, no rebound, good bs     EXT-no edema     Data Review     Recent Labs      10/25/17   0430  10/24/17   1721   WBC   --   5.2   HGB  12.7  13.2   HCT  38.4  37.8   PLT   --   134*     Recent Labs      10/25/17   0430  10/24/17   1721   NA  141  139   K  4.0  4.7   CL  108  105   CO2  24  28   BUN  20  24*   CREA  1.53*  1.78*   GLU  72  81   PHOS  3.1   --    CA  8.5  8.7     Recent Labs      10/24/17   1721   SGOT  22   AP  67   TP  7.3   ALB  3.8   GLOB  3.5     Recent Labs      10/24/17   1721   INR  1.1   PTP  10.6   APTT  33.6*        IMAGING RESULTS:   []      I have personally reviewed the actual   []    CXR  []    CT  []     [address] discussed with:    [x]    Patient   []    Family   []    Nursing   []    Attending    Amber Sewell MD

## 2017-10-25 NOTE — ED NOTES
TRANSFER - OUT REPORT:    Verbal report given to JOI Batres(name) on Bailey Brunner  being transferred to Ortho(unit) for routine progression of care       Report consisted of patients Situation, Background, Assessment and   Recommendations(SBAR). Information from the following report(s) SBAR, ED Summary and MAR was reviewed with the receiving nurse. Lines:   Peripheral IV 10/24/17 Left Antecubital (Active)   Site Assessment Clean, dry, & intact 10/24/2017  6:36 PM   Phlebitis Assessment 0 10/24/2017  6:36 PM   Infiltration Assessment 0 10/24/2017  6:36 PM   Dressing Status Clean, dry, & intact 10/24/2017  6:36 PM   Hub Color/Line Status Pink 10/24/2017  6:36 PM        Opportunity for questions and clarification was provided.       Patient transported with:   Nuclear Med tech

## 2017-10-25 NOTE — ED NOTES
Notified KnowRe of pt's room assignment- [phone]. KnowRe stated she will take him there once study is completed.

## 2017-10-25 NOTE — H&P
Hospitalist Admission Note    NAME: Mavis Seo   :  1945   MRN:  [id number]     Date/Time:  10/24/2017 11:32 PM    Patient PCP: Poncho Latham MD  ________________________________________________________________________    My assessment of this patient's clinical condition and my plan of care is as follows. Assessment / Plan:  GI Bleed, hemodynamically stable  AFib, on Eliquis - currently held  HTN  -GI called by ED, recommendation is for NM scan and Protonix  -GI consult placed for tomorrow as well; NPO  -Current H/H 13.2/37.8, INR 1.1, Occult blood +  -H/H q8hrs; transfuse if Hgb less than 7  -Continue Afib home meds - holding Eliquis  -Will hold HTN med till repeat H/H shows stability    CKD, secondary to left nephrectomy in Dec 2015  -Pt has Nephrologist that he follows up with  -BUn/Cr .78  -Continue to monitor  -Avoid nephrotoxic drugs  -IVF    Code Status: Full  Surrogate Decision MakerRdalila Brower, wife, [phone]    DVT Prophylaxis: SCD  GI Prophylaxis: Protonix    Baseline: Independent ADLs        Subjective:   CHIEF COMPLAINT: blood soaked underwear    HISTORY OF PRESENT ILLNESS:     Mavis Seo is a 71 y.o.  male who presents with CC listed above. He states that for the past 3-4 years, he has noticed occasional bleeding from his rectum. He states that it is never in his actual stool, but rather he notices it on the toilet paper when he wipes. He states that this time, he noticed some blood on his TP this morning, and early this afternoon while he was sitting on a chair, he noticed that his underwear and pants were being stained with blood. He states that he was diagnosed with Afib 2-3 years ago and has since been on Eliquis but has noticed the blood on TP since before then. States that his last colonoscopy was 20+ years ago. Denies any NSAID use. Denies any CP, abdominal pain, dizziness, or double vision.     We were asked to admit for work up and evaluation of the above problems. Past Medical History:   Diagnosis Date    Arrhythmia     atrial fibrillation 2014    Arthritis     both hands    Cancer ([facility] [address]     skin cancer    Hypertension         History reviewed. No pertinent surgical history. Social History   Substance Use Topics    Smoking status: Former Smoker     Quit date: 8/16/2014    Smokeless tobacco: Never Used    Alcohol use Not on file      Comment: quit drinking 1 month ago        History reviewed. No pertinent family history. No Known Allergies     Prior to Admission medications    Medication Sig Start Date End Date Taking? Authorizing Provider   valsartan (DIOVAN) 80 mg tablet Take 80 mg by mouth daily. Yes Historical Provider   rosuvastatin (CRESTOR) 10 mg tablet Take 10 mg by mouth daily (after lunch). Yes Historical Provider   UBIDECARENONE (COENZYME Q10 PO) Take 1 Tab by mouth daily. Yes Historical Provider   multivitamin (ONE A DAY) tablet Take 1 Tab by mouth daily. Yes Historical Provider   apixaban (ELIQUIS) 5 mg tablet Take 5 mg by mouth two (2) times a day. Yes Historical Provider   omega-3 fatty acids-vitamin e (FISH OIL) 1,000 mg cap Take 1 capsule by mouth. Yes Historical Provider   dronedarone (MULTAQ) tab tablet Take 1 tablet by mouth two (2) times daily (with meals). 10/16/14  Yes Cat Jacobson MD       REVIEW OF SYSTEMS:     I am not able to complete the review of systems because:    The patient is intubated and sedated    The patient has altered mental status due to his acute medical problems    The patient has baseline aphasia from prior stroke(s)    The patient has baseline dementia and is not reliable historian    The patient is in acute medical distress and unable to provide information           Total of 12 systems reviewed as follows:       POSITIVE= underlined text  Negative = text not underlined  General:  fever, chills, sweats, generalized weakness, weight loss/gain,      loss of appetite Eyes:    blurred vision, eye pain, loss of vision, double vision  ENT:    rhinorrhea, pharyngitis   Respiratory:   cough, sputum production, SOB, BURNETT, wheezing, pleuritic pain   Cardiology:   chest pain, palpitations, orthopnea, PND, edema, syncope   Gastrointestinal:  abdominal pain , N/V, diarrhea, dysphagia, constipation, bleeding   Genitourinary:  frequency, urgency, dysuria, hematuria, incontinence   Muskuloskeletal :  arthralgia, myalgia, back pain  Hematology:  easy bruising, nose or gum bleeding, lymphadenopathy   Dermatological: rash, ulceration, pruritis, color change / jaundice  Endocrine:   hot flashes or polydipsia   Neurological:  headache, dizziness, confusion, focal weakness, paresthesia,     Speech difficulties, memory loss, gait difficulty  Psychological: Feelings of anxiety, depression, agitation    Objective:   VITALS:    Visit Vitals    /62    Pulse (!) 44    Temp 98.5 °F (36.9 °C)    Resp 8    Ht 5' 10\" (1.778 m)    Wt 78.2 kg (172 lb 6.4 oz)    SpO2 100%    BMI 24.74 kg/m2       PHYSICAL EXAM:    General:    Alert, cooperative, no distress, appears stated age. HEENT: Atraumatic, anicteric sclerae, pink conjunctivae     No oral ulcers, mucosa moist, throat clear, dentition fair  Neck:  Supple, symmetrical,  thyroid: non tender  Lungs:   Clear to auscultation bilaterally. No Wheezing or Rhonchi. No rales. Chest wall:  No tenderness  No Accessory muscle use. Heart:   Irregular rate and rhythm,  No  murmur   No edema  Abdomen:   Soft, non-tender. Not distended. Bowel sounds normal  Extremities: No cyanosis. No clubbing,      Skin turgor normal, Capillary refill normal, Radial dial pulse 2+  Skin:     Not pale. Not Jaundiced  No rashes   Psych:  Good insight. Not depressed. Not anxious or agitated. Neurologic: EOMs intact. No facial asymmetry. No aphasia or slurred speech. Symmetrical strength, Sensation grossly intact. Alert and oriented X 4. _______________________________________________________________________  Care Plan discussed with:    Comments   Patient y    Family      RN y    Care Manager                    Consultant:      _______________________________________________________________________  Expected  Disposition:   Home with Family y   HH/PT/OT/RN    SNF/LTC    GABI    ________________________________________________________________________  TOTAL TIME:  54 Minutes    Critical Care Provided     Minutes non procedure based      Comments    y Reviewed previous records   >50% of visit spent in counseling and coordination of care y Discussion with patient and/or family and questions answered       ________________________________________________________________________  Signed: Felicita Alvraado MD    Procedures: see electronic medical records for all procedures/Xrays and details which were not copied into this note but were reviewed prior to creation of Plan. LAB DATA REVIEWED:    Recent Results (from the past 24 hour(s))   TYPE & SCREEN    Collection Time: 10/24/17  5:20 PM   Result Value Ref Range    Crossmatch Expiration 10/27/2017     ABO/Rh(D) Maxcine Guanaco POSITIVE     Antibody screen NEG    CBC WITH AUTOMATED DIFF    Collection Time: 10/24/17  5:21 PM   Result Value Ref Range    WBC 5.2 4.1 - 11.1 K/uL    RBC 4.17 4.10 - 5.70 M/uL    HGB 13.2 12.1 - 17.0 g/dL    HCT 37.8 36.6 - 50.3 %    MCV 90.6 80.0 - 99.0 FL    MCH 31.7 26.0 - 34.0 PG    MCHC 34.9 30.0 - 36.5 g/dL    RDW 13.5 11.5 - 14.5 %    PLATELET 420 (L) 077 - 400 K/uL    NEUTROPHILS 61 32 - 75 %    LYMPHOCYTES 31 12 - 49 %    MONOCYTES 6 5 - 13 %    EOSINOPHILS 2 0 - 7 %    BASOPHILS 0 0 - 1 %    ABS. NEUTROPHILS 3.2 1.8 - 8.0 K/UL    ABS. LYMPHOCYTES 1.6 0.8 - 3.5 K/UL    ABS. MONOCYTES 0.3 0.0 - 1.0 K/UL    ABS. EOSINOPHILS 0.1 0.0 - 0.4 K/UL    ABS.  BASOPHILS 0.0 0.0 - 0.1 K/UL   METABOLIC PANEL, COMPREHENSIVE    Collection Time: 10/24/17  5:21 PM   Result Value Ref Range    Sodium 139 136 - 145 mmol/L    Potassium 4.7 3.5 - 5.1 mmol/L    Chloride 105 97 - 108 mmol/L    CO2 28 21 - 32 mmol/L    Anion gap 6 5 - 15 mmol/L    Glucose 81 65 - 100 mg/dL    BUN 24 (H) 6 - 20 MG/DL    Creatinine 1.78 (H) 0.70 - 1.30 MG/DL    BUN/Creatinine ratio 13 12 - 20      GFR est AA 46 (L) >60 ml/min/1.73m2    GFR est non-AA 38 (L) >60 ml/min/1.73m2    Calcium 8.7 8.5 - 10.1 MG/DL    Bilirubin, total 0.6 0.2 - 1.0 MG/DL    ALT (SGPT) 40 12 - 78 U/L    AST (SGOT) 22 15 - 37 U/L    Alk.  phosphatase 67 45 - 117 U/L    Protein, total 7.3 6.4 - 8.2 g/dL    Albumin 3.8 3.5 - 5.0 g/dL    Globulin 3.5 2.0 - 4.0 g/dL    A-G Ratio 1.1 1.1 - 2.2     PROTHROMBIN TIME + INR    Collection Time: 10/24/17  5:21 PM   Result Value Ref Range    INR 1.1 0.9 - 1.1      Prothrombin time 10.6 9.0 - 11.1 sec   PTT    Collection Time: 10/24/17  5:21 PM   Result Value Ref Range    aPTT 33.6 (H) 22.1 - 32.5 sec    aPTT, therapeutic range     58.0 - 77.0 SECS   OCCULT BLOOD, STOOL    Collection Time: 10/24/17  6:27 PM   Result Value Ref Range    Occult blood, stool POSITIVE (A) NEG

## 2017-10-25 NOTE — PROGRESS NOTES
Pharmacy Clarification of Prior to Admission Medication Regimen     The patient was interviewed regarding clarification of the prior to admission medication regimen. The patient's wife was present in room and obtained permission from patient to discuss drug regimen with visitor(s) present. The patient was questioned regarding use of any other inhalers, topical products, over the counter medications, herbal medications, vitamin products or ophthalmic/nasal/otic medication use. Information Obtained From: Patient, Rx query, personal medication list    Pertinent Pharmacy Findings:   Updated patients preferred outpatient pharmacy to: [facility]   Identified High Alert Medication Information  o Current Anticoagulants:  - Name: apixaban (ELIQUIS) 5 mg tablet    PTA medication list was corrected to the following:     Prior to Admission Medications   Prescriptions Last Dose Informant Patient Reported? Taking? UBIDECARENONE (COENZYME Q10 PO) 10/24/2017 at Unknown time Self Yes Yes   Sig: Take 1 Tab by mouth daily. apixaban (ELIQUIS) 5 mg tablet 10/24/2017 at Unknown time Self Yes Yes   Sig: Take 5 mg by mouth two (2) times a day. dronedarone (MULTAQ) tab tablet 10/24/2017 at Unknown time Self No Yes   Sig: Take 1 tablet by mouth two (2) times daily (with meals). multivitamin (ONE A DAY) tablet 10/24/2017 at Unknown time Self Yes Yes   Sig: Take 1 Tab by mouth daily. omega-3 fatty acids-vitamin e (FISH OIL) 1,000 mg cap 10/24/2017 at Unknown time Self Yes Yes   Sig: Take 1 capsule by mouth. rosuvastatin (CRESTOR) 10 mg tablet 10/24/2017 at Unknown time Self Yes Yes   Sig: Take 10 mg by mouth daily (after lunch). valsartan (DIOVAN) 80 mg tablet 10/24/2017 at Unknown time Self Yes Yes   Sig: Take 80 mg by mouth daily.       Facility-Administered Medications: None         Thank you,  Deacon Parent  Medication History Pharmacy Technician

## 2017-10-25 NOTE — PROGRESS NOTES
[facility] Dr. Julius Moreland, patient's HR is 38. Patient's HR staying in the 30s. Heart rate ranged from 50-43 throughout the night. Provided callback number [phone].    2699 Patient started the VTM. [address] [place] [address] concerning the patient's HR. Patient's HR currently 40.     1718 Dr. Julius Moreland states the patient usually runs 30-50 and as long he is asymptomatic he is fine.

## 2017-10-26 ENCOUNTER — ANESTHESIA (OUTPATIENT)
Dept: ENDOSCOPY | Age: 72
DRG: 395 | End: 2017-10-26
Payer: MEDICARE

## 2017-10-26 VITALS
TEMPERATURE: 96.2 F | SYSTOLIC BLOOD PRESSURE: 121 MMHG | OXYGEN SATURATION: 99 % | HEIGHT: 70 IN | WEIGHT: 172.4 LBS | DIASTOLIC BLOOD PRESSURE: 85 MMHG | BODY MASS INDEX: 24.68 KG/M2 | RESPIRATION RATE: 18 BRPM | HEART RATE: 53 BPM

## 2017-10-26 PROBLEM — K92.2 GI BLEED: Status: ACTIVE | Noted: 2017-10-26

## 2017-10-26 LAB
HCT VFR BLD AUTO: 38.2 % (ref 36.6–50.3)
HGB BLD-MCNC: 13 G/DL (ref 12.1–17)

## 2017-10-26 PROCEDURE — 36415 COLL VENOUS BLD VENIPUNCTURE: CPT | Performed by: INTERNAL MEDICINE

## 2017-10-26 PROCEDURE — 74011000250 HC RX REV CODE- 250

## 2017-10-26 PROCEDURE — 74011250636 HC RX REV CODE- 250/636: Performed by: INTERNAL MEDICINE

## 2017-10-26 PROCEDURE — 85018 HEMOGLOBIN: CPT | Performed by: INTERNAL MEDICINE

## 2017-10-26 PROCEDURE — 74011250637 HC RX REV CODE- 250/637: Performed by: GENERAL ACUTE CARE HOSPITAL

## 2017-10-26 PROCEDURE — 76060000031 HC ANESTHESIA FIRST 0.5 HR: Performed by: INTERNAL MEDICINE

## 2017-10-26 PROCEDURE — 0DBN8ZZ EXCISION OF SIGMOID COLON, VIA NATURAL OR ARTIFICIAL OPENING ENDOSCOPIC: ICD-10-PCS | Performed by: INTERNAL MEDICINE

## 2017-10-26 PROCEDURE — 65660000000 HC RM CCU STEPDOWN

## 2017-10-26 PROCEDURE — 99218 HC RM OBSERVATION: CPT

## 2017-10-26 PROCEDURE — 88305 TISSUE EXAM BY PATHOLOGIST: CPT | Performed by: INTERNAL MEDICINE

## 2017-10-26 PROCEDURE — 77030013992 HC SNR POLYP ENDOSC BSC -B: Performed by: INTERNAL MEDICINE

## 2017-10-26 PROCEDURE — 0DBH8ZZ EXCISION OF CECUM, VIA NATURAL OR ARTIFICIAL OPENING ENDOSCOPIC: ICD-10-PCS | Performed by: INTERNAL MEDICINE

## 2017-10-26 PROCEDURE — 76040000019: Performed by: INTERNAL MEDICINE

## 2017-10-26 PROCEDURE — 74011250636 HC RX REV CODE- 250/636

## 2017-10-26 RX ORDER — DEXTROMETHORPHAN/PSEUDOEPHED 2.5-7.5/.8
1.2 DROPS ORAL
Status: DISCONTINUED | OUTPATIENT
Start: 2017-10-26 | End: 2017-10-26 | Stop reason: HOSPADM

## 2017-10-26 RX ORDER — SODIUM CHLORIDE 0.9 % (FLUSH) 0.9 %
5-10 SYRINGE (ML) INJECTION EVERY 8 HOURS
Status: DISCONTINUED | OUTPATIENT
Start: 2017-10-26 | End: 2017-10-26 | Stop reason: HOSPADM

## 2017-10-26 RX ORDER — MIDAZOLAM HYDROCHLORIDE 1 MG/ML
.25-5 INJECTION, SOLUTION INTRAMUSCULAR; INTRAVENOUS
Status: DISCONTINUED | OUTPATIENT
Start: 2017-10-26 | End: 2017-10-26 | Stop reason: HOSPADM

## 2017-10-26 RX ORDER — SODIUM CHLORIDE 9 MG/ML
100 INJECTION, SOLUTION INTRAVENOUS CONTINUOUS
Status: DISCONTINUED | OUTPATIENT
Start: 2017-10-26 | End: 2017-10-26 | Stop reason: HOSPADM

## 2017-10-26 RX ORDER — NALOXONE HYDROCHLORIDE 0.4 MG/ML
0.4 INJECTION, SOLUTION INTRAMUSCULAR; INTRAVENOUS; SUBCUTANEOUS
Status: DISCONTINUED | OUTPATIENT
Start: 2017-10-26 | End: 2017-10-26 | Stop reason: HOSPADM

## 2017-10-26 RX ORDER — SODIUM CHLORIDE 0.9 % (FLUSH) 0.9 %
5-10 SYRINGE (ML) INJECTION AS NEEDED
Status: DISCONTINUED | OUTPATIENT
Start: 2017-10-26 | End: 2017-10-26 | Stop reason: HOSPADM

## 2017-10-26 RX ORDER — FLUMAZENIL 0.1 MG/ML
0.2 INJECTION INTRAVENOUS
Status: DISCONTINUED | OUTPATIENT
Start: 2017-10-26 | End: 2017-10-26 | Stop reason: HOSPADM

## 2017-10-26 RX ORDER — MIDAZOLAM HYDROCHLORIDE 1 MG/ML
1-2 INJECTION, SOLUTION INTRAMUSCULAR; INTRAVENOUS
Status: DISCONTINUED | OUTPATIENT
Start: 2017-10-26 | End: 2017-10-26 | Stop reason: HOSPADM

## 2017-10-26 RX ORDER — PROPOFOL 10 MG/ML
INJECTION, EMULSION INTRAVENOUS AS NEEDED
Status: DISCONTINUED | OUTPATIENT
Start: 2017-10-26 | End: 2017-10-26 | Stop reason: HOSPADM

## 2017-10-26 RX ORDER — EPINEPHRINE 0.1 MG/ML
1 INJECTION INTRACARDIAC; INTRAVENOUS
Status: DISCONTINUED | OUTPATIENT
Start: 2017-10-26 | End: 2017-10-26 | Stop reason: HOSPADM

## 2017-10-26 RX ORDER — ATROPINE SULFATE 0.1 MG/ML
0.5 INJECTION INTRAVENOUS
Status: DISCONTINUED | OUTPATIENT
Start: 2017-10-26 | End: 2017-10-26 | Stop reason: HOSPADM

## 2017-10-26 RX ORDER — LIDOCAINE HYDROCHLORIDE 20 MG/ML
INJECTION, SOLUTION EPIDURAL; INFILTRATION; INTRACAUDAL; PERINEURAL AS NEEDED
Status: DISCONTINUED | OUTPATIENT
Start: 2017-10-26 | End: 2017-10-26 | Stop reason: HOSPADM

## 2017-10-26 RX ORDER — GLYCOPYRROLATE 0.2 MG/ML
INJECTION INTRAMUSCULAR; INTRAVENOUS AS NEEDED
Status: DISCONTINUED | OUTPATIENT
Start: 2017-10-26 | End: 2017-10-26 | Stop reason: HOSPADM

## 2017-10-26 RX ADMIN — THERA TABS 1 TABLET: TAB at 09:29

## 2017-10-26 RX ADMIN — DRONEDARONE 400 MG: 400 TABLET, FILM COATED ORAL at 09:29

## 2017-10-26 RX ADMIN — SODIUM CHLORIDE 100 ML/HR: 900 INJECTION, SOLUTION INTRAVENOUS at 07:49

## 2017-10-26 RX ADMIN — LIDOCAINE HYDROCHLORIDE 40 MG: 20 INJECTION, SOLUTION EPIDURAL; INFILTRATION; INTRACAUDAL; PERINEURAL at 07:57

## 2017-10-26 RX ADMIN — PROPOFOL 120 MG: 10 INJECTION, EMULSION INTRAVENOUS at 08:19

## 2017-10-26 RX ADMIN — ATORVASTATIN CALCIUM 20 MG: 20 TABLET, FILM COATED ORAL at 09:29

## 2017-10-26 RX ADMIN — GLYCOPYRROLATE 0.2 MG: 0.2 INJECTION INTRAMUSCULAR; INTRAVENOUS at 07:57

## 2017-10-26 RX ADMIN — Medication 10 ML: at 06:42

## 2017-10-26 RX ADMIN — OMEGA-3 FATTY ACIDS CAP DELAYED RELEASE 1000 MG 1 CAPSULE: 1000 CAPSULE DELAYED RELEASE at 09:29

## 2017-10-26 NOTE — DISCHARGE SUMMARY
Hospitalist Discharge Summary     Patient ID:  Darlis Kanner  [id number]  64 y.o.  1945    PCP on record: Mars Wagner MD    Admit date: 10/24/2017  Discharge date and time: 10/26/2017      DISCHARGE DIAGNOSIS:    Hematochezia from internal hemorrhoids  Colon polyps  Hx Afib  Sinus bradycardia  HTN  CKD, secondary to left nephrectomy in Dec 2015      CONSULTATIONS:  IP CONSULT TO GASTROENTEROLOGY    Excerpted HPI from H&P   CHIEF COMPLAINT: blood soaked underwear     HISTORY OF PRESENT ILLNESS:     Darlis Kanner is a 71 y.o.  male who presents with CC listed above. He states that for the past 3-4 years, he has noticed occasional bleeding from his rectum. He states that it is never in his actual stool, but rather he notices it on the toilet paper when he wipes. He states that this time, he noticed some blood on his TP this morning, and early this afternoon while he was sitting on a chair, he noticed that his underwear and pants were being stained with blood. He states that he was diagnosed with Afib 2-3 years ago and has since been on Eliquis but has noticed the blood on TP since before then. States that his last colonoscopy was 20+ years ago. Denies any NSAID use. Denies any CP, abdominal pain, dizziness, or double vision. ______________________________________________________________________  DISCHARGE SUMMARY/HOSPITAL COURSE:  for full details see H&P, daily progress notes, labs, consult notes. Hematochezia from hemorrhoids  Colon polyps    -Colonoscopy:  Findings:   Rectum: Grade 2 internal hemorrhoid(s); Sigmoid: 10 mm polyp with inflamed friable top, on stalk, hot snared. Few diverticula seen  Descending Colon: normal  Transverse Colon: normal  Ascending Colon:     - Diverticulosis  Cecum: 4-5 mm polyp base of cecum, cold snared  Terminal Ileum: not intubated     Hx Afib  Sinus bradycardia  HTN  -continue multaq.   Holding diovan.  -patient reports baseline HR in 40s when he checks at home. He has remained asymptomatic. He follows with Dr Ramonita Brown    CKD, secondary to left nephrectomy in Dec 2015    _______________________________________________________________________  Patient seen and examined by me on discharge day. Pertinent Findings:  Gen:    Not in distress  Chest: Clear lungs  CVS:   Regular rhythm. No edema  Abd:  Soft, not distended, not tender  Neuro:  Alert, Oriented x 4, grossly non focal exam  _______________________________________________________________________  DISCHARGE MEDICATIONS:   Current Discharge Medication List      CONTINUE these medications which have CHANGED    Details   apixaban (ELIQUIS) 5 mg tablet Take 1 Tab by mouth two (2) times a day. CONTINUE these medications which have NOT CHANGED    Details   valsartan (DIOVAN) 80 mg tablet Take 80 mg by mouth daily. rosuvastatin (CRESTOR) 10 mg tablet Take 10 mg by mouth daily (after lunch). UBIDECARENONE (COENZYME Q10 PO) Take 1 Tab by mouth daily. multivitamin (ONE A DAY) tablet Take 1 Tab by mouth daily. omega-3 fatty acids-vitamin e (FISH OIL) 1,000 mg cap Take 1 capsule by mouth. dronedarone (MULTAQ) tab tablet Take 1 tablet by mouth two (2) times daily (with meals). Qty: 60 tablet, Refills: 12             My Recommended Diet, Activity, Wound Care, and follow-up labs are listed in the patient's Discharge Insturctions which I have personally completed and reviewed.     _______________________________________________________________________  DISPOSITION:    Home with Family: x   Home with HH/PT/OT/RN:    SNF/LTC:    GABI:    OTHER:        Condition at Discharge:  Stable  _______________________________________________________________________  Follow up with:   PCP : Vidhi Chapman MD  Follow-up Information     Follow up With Details 9505 Alverto Ramos MD On 11/2/2017 10;00[facility] follow-up [address] Fernando [facility] MD Angelo In 35 months Repeat Colonoscopy [address] [facility]  [address] [facility]  [facility]  [phone]                Total time in minutes spent coordinating this discharge (includes going over instructions, follow-up, prescriptions, and preparing report for sign off to her PCP) :  35 minutes    Signed:  Katarina Garcia MD

## 2017-10-26 NOTE — PROGRESS NOTES
TRANSFER - IN REPORT:    Verbal report received from Adam Ho RN (name) on Elvia Camarillo  being received from Ortho room 3235(unit) for ordered procedure      Report consisted of patients Situation, Background, Assessment and   Recommendations(SBAR). Information from the following report(s) SBAR, Procedure Summary, Intake/Output, MAR, Recent Results and Cardiac Rhythm Sinus Hoa Larve with heart rate in the 30's was reviewed with the receiving nurse. Opportunity for questions and clarification was provided. Assessment completed upon patients arrival to unit and care assumed.

## 2017-10-26 NOTE — ROUTINE PROCESS
Geovanny Millan  1945  [id number]    Situation:  Verbal report received from: AMADOU Davidson RN  Procedure: Procedure(s):  COLONOSCOPY  ENDOSCOPIC POLYPECTOMY    Background:    Preoperative diagnosis: Hematochezia  Postoperative diagnosis: colon polyps, hemorrhoids, diverticulosis    :  Dr. Tina Do  Assistant(s): Endoscopy Technician-1: Ivy Lui  Endoscopy RN-1: Kit Alaniz RN    Specimens:   ID Type Source Tests Collected by Time Destination   1 : Cecal polyp Preservative Cecum  Nathan Segura MD 10/26/2017 4906 Pathology   2 : Sigmoid Colon Polyp Preservative Sigmoid  Nathna Segura MD 10/26/2017 9870 Pathology     H. Pylori  no    Assessment:  Intra-procedure medications       Anesthesia gave intra-procedure sedation and medications, see anesthesia flow sheet     Intravenous fluids: NS@ KVO     Vital signs stable     Abdominal assessment: round and soft     Recommendation:    Return to floor 12  Family or Friend   Permission to share finding with family or friend [place] wife

## 2017-10-26 NOTE — DISCHARGE INSTRUCTIONS
HOSPITALIST DISCHARGE INSTRUCTIONS    NAME: Bailey Brunner   :  1945   MRN:  [id number]     Date/Time:  10/26/2017 9:46 AM    ADMIT DATE: 10/24/2017   DISCHARGE DATE: 10/26/2017         · It is important that you take the medication exactly as they are prescribed. · Keep your medication in the bottles provided by the pharmacist and keep a list of the medication names, dosages, and times to be taken in your wallet. · Do not take other medications without consulting your doctor. What to do at Home    Recommended diet:  Regular Diet    Recommended activity: Activity as tolerated      If you have questions regarding the hospital related prescriptions or hospital related issues please call [facility] at [phone]. You can always direct your questions to your primary care doctor if you are unable to reach your hospital physician; your PCP works as an extension of your hospital doctor just like your hospital doctor is an extension of your PCP for your time at the hospital [facility])    If you experience any of the following symptoms then please call your primary care physician or return to the emergency room if you cannot get hold of your doctor:    Fever, chills, nausea, vomiting, or persistent diarrhea  Worsening weakness or new problems with your speech or balance  Dark stools or visible blood in your stools  New Leg swelling or shortness of breath as this could be signs of a clot    Additional Instructions:      Bring these papers with you to your follow up appointments. The papers will help your doctors be sure to continue the care plan from the hospital.              Information obtained by :  I understand that if any problems occur once I am at home I am to contact my physician. I understand and acknowledge receipt of the instructions indicated above. Physician's or R.N.'s Signature                                                                  Date/Time                                                                                                                                              Patient or Representative Signature

## 2017-10-26 NOTE — PROGRESS NOTES
Hospitalist Progress Note    NAME: Bella Beck   :  1945   MRN:  [id number]     Interim Hospital Summary: 71 y.o. male whom presented on 10/24/2017 with      Assessment / Plan:    GI Bleed / hematochezia  -Hg stable  -holding eliquis  -for Colonoscopy tomorrow    PAF  Sinus bradycardia  HTN  -continue multaq. Holding diovan.  -patient reports baseline HR in 40s when he checks at home. He has remained asymptomatic. He follows with Dr Linden Malhotra    CKD, secondary to left nephrectomy in Dec 2015         Code Status: Full  Surrogate Decision Maker: Jerica Teresa, wife, [phone]     DVT Prophylaxis: SCD  GI Prophylaxis: Protonix     Baseline: Independent ADLs  Body mass index is 24.74 kg/(m^2). Subjective:     Chief Complaint / Reason for Physician Visit  Follow up of GI bleeding, afib, HTN  Chart reviewed in detail. Discussed with RN events overnight. Review of Systems:  Symptom Y/N Comments  Symptom Y/N Comments   Fever/Chills    Chest Pain n    Poor Appetite    Edema     Cough    Abdominal Pain n    Sputum    Joint Pain     SOB/BURNETT    Pruritis/Rash     Nausea/vomit n   Tolerating PT/OT     Diarrhea    Tolerating Diet     Constipation    Other       Could NOT obtain due to:      PO intake: No data found. Objective:     VITALS:   Last 24hrs VS reviewed since prior progress note. Most recent are:  Patient Vitals for the past 24 hrs:   Temp Pulse Resp BP SpO2   10/25/17 2143 98 °F (36.7 °C) (!) 47 18 142/77 96 %   10/25/17 1421 97.3 °F (36.3 °C) (!) 55 17 148/72 99 %   10/25/17 1036 97.5 °F (36.4 °C) (!) 39 18 123/69 93 %   10/25/17 0600 98.3 °F (36.8 °C) (!) 43 18 156/75 97 %   10/25/17 0153 97.8 °F (36.6 °C) (!) 42 18 164/42 97 %   10/24/17 2330 - (!) 44 8 157/66 98 %   10/24/17 2230 - (!) 43 10 156/65 99 %     No intake or output data in the 24 hours ending 10/25/17 2156     PHYSICAL EXAM:  General: WD, WN.  Alert, cooperative, no acute distress    EENT:  EOMI. Anicteric sclerae. MMM  Resp:  CTA bilaterally, no wheezing or rales. No accessory muscle use  CV:  Regular  rhythm,  No edema  GI:  Soft, Non distended, Non tender.  +Bowel sounds  Neurologic:  Alert and oriented X 3, normal speech,   Psych:   Good insight. Not anxious nor agitated  Skin:  No rashes. No jaundice    Reviewed most current lab test results and cultures  YES  Reviewed most current radiology test results   YES  Review and summation of old records today    NO  Reviewed patient's current orders and MAR    YES  PMH/SH reviewed - no change compared to H&P  ________________________________________________________________________  Care Plan discussed with:    Comments   Patient x    Family  x wife   RN     Care Manager     Consultant                        Multidiciplinary team rounds were held today with , nursing, pharmacist and clinical coordinator. Patient's plan of care was discussed; medications were reviewed and discharge planning was addressed. ________________________________________________________________________  Total NON critical care TIME:  25   Minutes    Total CRITICAL CARE TIME Spent:   Minutes non procedure based      Comments   >50% of visit spent in counseling and coordination of care x     This includes time during multidisciplinary rounds if indicated above   ________________________________________________________________________  Arley Goodell, MD     Procedures: see electronic medical records for all procedures/Xrays and details which were not copied into this note but were reviewed prior to creation of Plan. LABS:  I reviewed today's most current labs and imaging studies.   Pertinent labs include:  Recent Labs      10/25/17   1556  10/25/17   0430  10/24/17   1721   WBC   --    --   5.2   HGB  13.3  12.7  13.2   HCT  39.0  38.4  37.8   PLT   --    --   134*     Recent Labs      10/25/17   0430  10/24/17   1721   NA  141  139   K  4.0 4.7   CL  108  105   CO2  24  28   GLU  72  81   BUN  20  24*   CREA  1.53*  1.78*   CA  8.5  8.7   MG  2.1   --    PHOS  3.1   --    ALB   --   3.8   TBILI   --   0.6   SGOT   --   22   ALT   --   40   INR   --   1.1

## 2017-10-26 NOTE — ANESTHESIA POSTPROCEDURE EVALUATION
Post-Anesthesia Evaluation and Assessment    Patient: Charmayne Gandy MRN: [id number]  SSN: [id number]    YOB: 1945  Age: 71 y.o. Sex: male       Cardiovascular Function/Vital Signs  Visit Vitals    /85 (BP 1 Location: Left arm, BP Patient Position: Sitting)    Pulse (!) 53    Temp 35.7 °C (96.2 °F)    Resp 18    Ht 5' 10\" (1.778 m)    Wt 78.2 kg (172 lb 6.4 oz)    SpO2 99%    BMI 24.74 kg/m2       Patient is status post total IV anesthesia anesthesia for Procedure(s):  COLONOSCOPY  ENDOSCOPIC POLYPECTOMY. Nausea/Vomiting: None    Postoperative hydration reviewed and adequate. Pain:  Pain Scale 1: Numeric (0 - 10) (10/26/17 0844)  Pain Intensity 1: 0 (10/26/17 0844)   Managed    Neurological Status: At baseline    Mental Status and Level of Consciousness: Arousable    Pulmonary Status:   O2 Device: Room air (10/26/17 0844)   Adequate oxygenation and airway patent    Complications related to anesthesia: None    Post-anesthesia assessment completed.  No concerns    Signed By: Mary Cedeño DO     October 26, 2017

## 2017-10-26 NOTE — PROGRESS NOTES
TRANSFER - OUT REPORT:    Verbal report given to Nohemi Torres RN (name) on Farideh Whipple  being transferred to room 414 0848 (unit) for routine progression of care       Report consisted of patients Situation, Background, Assessment and   Recommendations(SBAR). Information from the following report(s) SBAR, Procedure Summary, Intake/Output, MAR and Recent Results was reviewed with the receiving nurse. Lines:   Peripheral IV 10/24/17 Left Antecubital (Active)   Site Assessment Clean, dry, & intact 10/26/2017  7:37 AM   Phlebitis Assessment 0 10/26/2017  7:37 AM   Infiltration Assessment 0 10/26/2017  7:37 AM   Dressing Status Clean, dry, & intact 10/26/2017  7:37 AM   Dressing Type Tape;Transparent 10/26/2017  7:37 AM   Hub Color/Line Status Pink;Flushed; Infusing 10/26/2017  7:37 AM        Opportunity for questions and clarification was provided.

## 2017-10-26 NOTE — PROGRESS NOTES
Anesthesia reports 120 mg Propofol, 40 mg Lidocaine and 800 mL NS and 0.2 mg Robinul given during procedure. Received report from anesthesia staff on vital signs and status of patient.

## 2017-10-26 NOTE — PROGRESS NOTES
Discharge instructions discussed with the patient and his wife Cy Cousin. Questions answered. IV removed. Tele box removed. Belongings at bedside. Volunteer services requested.

## 2018-06-26 ENCOUNTER — HOSPITAL ENCOUNTER (OUTPATIENT)
Dept: GENERAL RADIOLOGY | Age: 73
Discharge: HOME | End: 2018-06-26
Payer: MEDICARE

## 2018-06-26 DIAGNOSIS — Z90.5 H/O KIDNEY REMOVAL: ICD-10-CM

## 2018-06-26 PROCEDURE — 71046 X-RAY EXAM CHEST 2 VIEWS: CPT

## 2018-08-24 ENCOUNTER — HOSPITAL ENCOUNTER (OUTPATIENT)
Dept: RADIATION THERAPY | Age: 73
Discharge: HOME | End: 2018-08-24

## 2021-08-03 PROBLEM — K92.2 GI BLEED: Status: RESOLVED | Noted: 2017-10-26 | Resolved: 2021-08-03

## 2022-03-10 ENCOUNTER — HOSPITAL ENCOUNTER (OUTPATIENT)
Dept: PREADMISSION TESTING | Age: 77
Discharge: HOME | End: 2022-03-10
Payer: MEDICARE

## 2022-03-10 PROCEDURE — U0005 INFEC AGEN DETEC AMPLI PROBE: HCPCS

## 2022-03-11 LAB
SARS-COV-2, XPLCVT: NOT DETECTED
SOURCE, COVRS: NORMAL

## 2022-03-14 ENCOUNTER — HOSPITAL ENCOUNTER (OUTPATIENT)
Dept: NON INVASIVE DIAGNOSTICS | Age: 77
Discharge: HOME | End: 2022-03-14
Payer: MEDICARE

## 2022-03-14 VITALS
WEIGHT: 185 LBS | BODY MASS INDEX: 26.54 KG/M2 | OXYGEN SATURATION: 99 % | DIASTOLIC BLOOD PRESSURE: 70 MMHG | SYSTOLIC BLOOD PRESSURE: 133 MMHG | HEART RATE: 54 BPM | RESPIRATION RATE: 10 BRPM

## 2022-03-14 DIAGNOSIS — I48.91 ATRIAL FIBRILLATION, UNSPECIFIED TYPE (HCC): ICD-10-CM

## 2022-03-14 PROCEDURE — 74011250636 HC RX REV CODE- 250/636: Performed by: INTERNAL MEDICINE

## 2022-03-14 PROCEDURE — 74011000250 HC RX REV CODE- 250: Performed by: INTERNAL MEDICINE

## 2022-03-14 PROCEDURE — 99152 MOD SED SAME PHYS/QHP 5/>YRS: CPT

## 2022-03-14 PROCEDURE — 92960 CARDIOVERSION ELECTRIC EXT: CPT

## 2022-03-14 PROCEDURE — 93005 ELECTROCARDIOGRAM TRACING: CPT

## 2022-03-14 RX ORDER — LIDOCAINE HYDROCHLORIDE 20 MG/ML
15 SOLUTION OROPHARYNGEAL AS NEEDED
Status: DISCONTINUED | OUTPATIENT
Start: 2022-03-14 | End: 2022-03-14

## 2022-03-14 RX ORDER — MIDAZOLAM HYDROCHLORIDE 1 MG/ML
.5-2 INJECTION, SOLUTION INTRAMUSCULAR; INTRAVENOUS
Status: DISCONTINUED | OUTPATIENT
Start: 2022-03-14 | End: 2022-03-14

## 2022-03-14 RX ORDER — FENTANYL CITRATE 50 UG/ML
12.5-5 INJECTION, SOLUTION INTRAMUSCULAR; INTRAVENOUS
Status: DISCONTINUED | OUTPATIENT
Start: 2022-03-14 | End: 2022-03-14

## 2022-03-14 RX ADMIN — LIDOCAINE HYDROCHLORIDE 15 ML: 20 SOLUTION ORAL at 13:20

## 2022-03-14 RX ADMIN — MIDAZOLAM HYDROCHLORIDE 2 MG: 1 INJECTION, SOLUTION INTRAMUSCULAR; INTRAVENOUS at 13:29

## 2022-03-14 RX ADMIN — MIDAZOLAM HYDROCHLORIDE 1 MG: 1 INJECTION, SOLUTION INTRAMUSCULAR; INTRAVENOUS at 13:33

## 2022-03-14 RX ADMIN — FENTANYL CITRATE 25 MCG: 50 INJECTION, SOLUTION INTRAMUSCULAR; INTRAVENOUS at 13:41

## 2022-03-14 RX ADMIN — FENTANYL CITRATE 50 MCG: 50 INJECTION, SOLUTION INTRAMUSCULAR; INTRAVENOUS at 13:29

## 2022-03-14 RX ADMIN — BENZOCAINE, BUTAMBEN, AND TETRACAINE HYDROCHLORIDE 1 SPRAY: .028; .004; .004 AEROSOL, SPRAY TOPICAL at 13:20

## 2022-03-14 RX ADMIN — MIDAZOLAM HYDROCHLORIDE 1 MG: 1 INJECTION, SOLUTION INTRAMUSCULAR; INTRAVENOUS at 13:40

## 2022-03-14 RX ADMIN — MIDAZOLAM HYDROCHLORIDE 1 MG: 1 INJECTION, SOLUTION INTRAMUSCULAR; INTRAVENOUS at 13:42

## 2022-03-14 NOTE — PROGRESS NOTES
Pt sedated with 3mg Versed and 50mcg Fentanyl for TARA (monitored sedation from 129 to 140)    Pt sedated with an additional 2mg Versed and 25mcg Fentanyl, given 1 synchronized shock(s) at 300 Joules, AFib converted to NSR/SB. (monitored sedation from 140 to 152)

## 2022-03-14 NOTE — DISCHARGE INSTRUCTIONS
DISCHARGE SUMMARY     The following personal items collected during your admission are returned to you:   Dental Appliance:    Vision:    Hearing Aid:    Jewelry:    Clothing:       PATIENT INSTRUCTIONS: Continue taking all the same medications. You had a transesophageal echocardiogram, your throat was numbed for the procedure, so start with sips of water and advance diet as swallowing returns to normal. Avoid any scalding hot beverages for the next 2 hours. The cardioversion procedure can cause redness to the skin where the patches were placed. You may treat this with Aloe or Cortisone cream over the counter lotion. If the skin appears very reddened or blistered contact your physician    Call to make an appointment with Dr. Amber Pham in 2 week(s). What to do at Home:  Recommended activity: No driving today      The discharge information has been reviewed with the PATIENT . The PATIENT  verbalized understanding.

## 2022-03-14 NOTE — PROGRESS NOTES
Patient arrived to Non-Invasive Cardiology Lab for Out Patient TARA/DCC Procedure. Staff introduced to patient. Patient identifiers verified with Name and Date of Birth. Procedure verified with patient. Consent forms reviewed and signed by patient or authorized representative and verified. Allergies verified. Patient informed of procedure and plan of care. Questions answered with review. Patient on cardiac monitor, non-invasive blood pressure, SPO2 monitor. On RA. Patient is A&Ox3. Patient reports no complaints. Patient on stretcher, in low position, with side rails up. Patient instructed to call for assistance as needed. Family in waiting room.

## 2022-03-15 LAB
ATRIAL RATE: 50 BPM
CALCULATED P AXIS, ECG09: 76 DEGREES
CALCULATED R AXIS, ECG10: 5 DEGREES
CALCULATED T AXIS, ECG11: 72 DEGREES
DIAGNOSIS, 93000: NORMAL
P-R INTERVAL, ECG05: 252 MS
Q-T INTERVAL, ECG07: 436 MS
QRS DURATION, ECG06: 98 MS
QTC CALCULATION (BEZET), ECG08: 397 MS
VENTRICULAR RATE, ECG03: 50 BPM

## 2022-03-19 PROBLEM — K92.2 ACUTE GI BLEEDING: Status: ACTIVE | Noted: 2017-10-24

## 2023-05-20 RX ORDER — ROSUVASTATIN CALCIUM 10 MG/1
10 TABLET, COATED ORAL
COMMUNITY

## 2023-05-20 RX ORDER — VALSARTAN 80 MG/1
80 TABLET ORAL DAILY
COMMUNITY

## 2024-07-30 ENCOUNTER — HOSPITAL ENCOUNTER (OUTPATIENT)
Facility: HOSPITAL | Age: 79
Discharge: HOME | End: 2024-08-02
Payer: MEDICARE

## 2024-07-30 DIAGNOSIS — F17.200 SMOKER: ICD-10-CM

## 2024-07-30 DIAGNOSIS — Z87.891 PERSONAL HISTORY OF TOBACCO USE: ICD-10-CM

## 2024-07-30 PROCEDURE — 71271 CT THORAX LUNG CANCER SCR C-: CPT

## 2025-02-07 ENCOUNTER — TRANSCRIBE ORDERS (OUTPATIENT)
Facility: HOSPITAL | Age: 80
End: 2025-02-07

## 2025-02-07 ENCOUNTER — HOSPITAL ENCOUNTER (OUTPATIENT)
Facility: HOSPITAL | Age: 80
Discharge: HOME | End: 2025-02-10
Payer: MEDICARE

## 2025-02-07 DIAGNOSIS — M25.551 RIGHT HIP PAIN: Primary | ICD-10-CM

## 2025-02-07 DIAGNOSIS — M25.551 RIGHT HIP PAIN: ICD-10-CM

## 2025-02-07 PROCEDURE — 73502 X-RAY EXAM HIP UNI 2-3 VIEWS: CPT

## 2025-08-21 ENCOUNTER — TRANSCRIBE ORDERS (OUTPATIENT)
Facility: HOSPITAL | Age: 80
End: 2025-08-21

## 2025-08-21 ENCOUNTER — HOSPITAL ENCOUNTER (OUTPATIENT)
Facility: HOSPITAL | Age: 80
Discharge: HOME | End: 2025-08-24

## 2025-08-21 DIAGNOSIS — M25.551 RIGHT HIP PAIN: Primary | ICD-10-CM

## 2025-08-21 DIAGNOSIS — M25.551 RIGHT HIP PAIN: ICD-10-CM

## 2025-09-01 ENCOUNTER — TRANSCRIBE ORDERS (OUTPATIENT)
Facility: HOSPITAL | Age: 80
End: 2025-09-01

## 2025-09-01 DIAGNOSIS — Z87.891 PERSONAL HISTORY OF TOBACCO USE: Primary | ICD-10-CM

## (undated) DEVICE — SYR 3ML LL TIP 1/10ML GRAD --

## (undated) DEVICE — Device

## (undated) DEVICE — Z DISCONTINUED PER MEDLINE LINE GAS SAMPLING O2/CO2 LNG AD 13 FT NSL W/ TBNG FILTERLINE

## (undated) DEVICE — 1200 GUARD II KIT W/5MM TUBE W/O VAC TUBE: Brand: GUARDIAN

## (undated) DEVICE — TRAP SUC MUCOUS 70ML -- MEDICHOICE MEDLINE

## (undated) DEVICE — SOLIDIFIER MEDC 1200ML -- CONVERT TO 356117

## (undated) DEVICE — NON-REM POLYHESIVE PATIENT RETURN ELECTRODE: Brand: VALLEYLAB

## (undated) DEVICE — SNARE ENDOSCP M L240CM W27MM SHTH DIA2.4MM CHN 2.8MM OVL

## (undated) DEVICE — Device: Brand: MEDEX

## (undated) DEVICE — KENDALL RADIOLUCENT FOAM MONITORING ELECTRODE RECTANGULAR SHAPE: Brand: KENDALL

## (undated) DEVICE — NEONATAL-ADULT SPO2 SENSOR: Brand: NELLCOR

## (undated) DEVICE — (D)SYR 10ML 1/5ML GRAD NSAF -- PKGING CHANGE USE ITEM 338027

## (undated) DEVICE — CONTAINER SPEC 20 ML LID NEUT BUFF FORMALIN 10 % POLYPR STS

## (undated) DEVICE — SET ADMIN 16ML TBNG L100IN 2 Y INJ SITE IV PIGGY BK DISP

## (undated) DEVICE — NEEDLE HYPO 18GA L1.5IN PNK S STL HUB POLYPR SHLD REG BVL

## (undated) DEVICE — CATH IV AUTOGRD BC PNK 20GA 25 -- INSYTE

## (undated) DEVICE — STRAINER URIN CALC RNL MSH -- CONVERT TO ITEM 357634

## (undated) DEVICE — BASIN EMSIS 16OZ GRAPHITE PLAS KID SHP MOLD GRAD FOR ORAL

## (undated) DEVICE — TOWEL 4 PLY TISS 19X30 SUE WHT